# Patient Record
Sex: FEMALE | URBAN - METROPOLITAN AREA
[De-identification: names, ages, dates, MRNs, and addresses within clinical notes are randomized per-mention and may not be internally consistent; named-entity substitution may affect disease eponyms.]

---

## 2019-03-05 ENCOUNTER — APPOINTMENT (OUTPATIENT)
Age: 39
Setting detail: DERMATOLOGY
End: 2019-04-07

## 2019-03-05 DIAGNOSIS — T2121XA: ICD-10-CM

## 2019-03-05 DIAGNOSIS — T2161XA: ICD-10-CM

## 2019-03-05 DIAGNOSIS — E66.01 MORBID (SEVERE) OBESITY DUE TO EXCESS CALORIES: ICD-10-CM

## 2019-03-05 DIAGNOSIS — N62 HYPERTROPHY OF BREAST: ICD-10-CM

## 2019-03-05 PROBLEM — T30.0 BURN OF UNSPECIFIED BODY REGION, UNSPECIFIED DEGREE: Status: ACTIVE | Noted: 2019-03-05

## 2019-03-05 PROCEDURE — OTHER COUNSELING - MACROMASTIA: OTHER

## 2019-03-05 PROCEDURE — OTHER DEFER: OTHER

## 2019-03-05 PROCEDURE — 99205 OFFICE O/P NEW HI 60 MIN: CPT

## 2019-03-05 PROCEDURE — OTHER MIPS QUALITY: OTHER

## 2019-03-05 PROCEDURE — OTHER PRESCRIPTION: OTHER

## 2019-03-05 PROCEDURE — OTHER OTHER: OTHER

## 2019-03-05 RX ORDER — SILVER SULFADIAZINE 10 MG/G
CREAM TOPICAL
Qty: 1 | Refills: 0 | Status: ERX | COMMUNITY
Start: 2019-03-05

## 2019-03-05 ASSESSMENT — LOCATION SIMPLE DESCRIPTION DERM
LOCATION SIMPLE: RIGHT BREAST
LOCATION SIMPLE: LEFT BREAST

## 2019-03-05 ASSESSMENT — LOCATION ZONE DERM: LOCATION ZONE: TRUNK

## 2019-03-05 ASSESSMENT — LOCATION DETAILED DESCRIPTION DERM
LOCATION DETAILED: LEFT MEDIAL BREAST 7-8:00 REGION
LOCATION DETAILED: RIGHT MEDIAL BREAST 5-6:00 REGION

## 2019-03-05 NOTE — PROCEDURE: DEFER
Instructions (Optional): Patient was advised she will need weight loss, d/c smoking, and we will need records from her PCP regarding previous treatments for her neck and back pain.  She was hesitant when providing medication list.  She later shared that she is being monitored at the [facility] clinic for previous addiction to pain medication.  She signed release for medical records for all treating providers. DE Instructions (Optional): Patient was advised she will need weight loss, d/c smoking, and we will need records from her PCP regarding previous treatments for her neck and back pain.  She was hesitant when providing medication list.  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  She signed release for medical records for all treating providers. DE

## 2019-03-05 NOTE — PROCEDURE: OTHER
Detail Level: Detailed
Other (Free Text): Ht-5’4\\nWt-237.4\\nBMI-40.68
Other (Free Text): Recommend referral to bariatric surgery. She was instructed to return to her PCP for further recommendations.\\nDiscussed high risk of surgical complications related to obesity including wound healing problems, infection, DVT/PE.
Other (Free Text): Start SSD to affected areas BID. RTC 3-4 weeks for re-evaluation or sooner for worsening.\\n